# Patient Record
Sex: MALE | Race: WHITE | NOT HISPANIC OR LATINO | Employment: STUDENT
[De-identification: names, ages, dates, MRNs, and addresses within clinical notes are randomized per-mention and may not be internally consistent; named-entity substitution may affect disease eponyms.]

---
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
## 2025-04-21 ENCOUNTER — OFFICE VISIT (OUTPATIENT)
Dept: URGENT CARE | Facility: URGENT CARE | Age: 20
End: 2025-04-21
Payer: COMMERCIAL

## 2025-04-21 VITALS
WEIGHT: 184 LBS | BODY MASS INDEX: 23.62 KG/M2 | HEART RATE: 60 BPM | DIASTOLIC BLOOD PRESSURE: 73 MMHG | TEMPERATURE: 97.4 F | OXYGEN SATURATION: 99 % | RESPIRATION RATE: 16 BRPM | SYSTOLIC BLOOD PRESSURE: 117 MMHG

## 2025-04-21 DIAGNOSIS — H00.11 CHALAZION OF RIGHT UPPER EYELID: Primary | ICD-10-CM

## 2025-04-21 PROCEDURE — 3078F DIAST BP <80 MM HG: CPT | Performed by: NURSE PRACTITIONER

## 2025-04-21 PROCEDURE — 3074F SYST BP LT 130 MM HG: CPT | Performed by: NURSE PRACTITIONER

## 2025-04-21 PROCEDURE — 99213 OFFICE O/P EST LOW 20 MIN: CPT | Performed by: NURSE PRACTITIONER

## 2025-04-21 RX ORDER — ERYTHROMYCIN 5 MG/G
0.5 OINTMENT OPHTHALMIC 3 TIMES DAILY
Qty: 3.5 G | Refills: 0 | Status: SHIPPED | OUTPATIENT
Start: 2025-04-21 | End: 2025-04-28

## 2025-04-21 NOTE — PATIENT INSTRUCTIONS
Prescribed erythromycin eye drops to utilize four times a day for 7 days.     Use warm compress with a clean wet washcloth 4 times a day.     Take daily antihistamine such as Claritin, Allegra or Zyrtec to help with itching, swelling or discomfort.    Do not wear contacts while treating eye. Wear glasses instead.    Follow up at eye clinic in one to two weeks.    If you develop any red flag symptoms (eye pain, fevers, etc) please go directly to the Emergency Department.

## 2025-04-21 NOTE — PROGRESS NOTES
Urgent Care Clinic Visit    Chief Complaint   Patient presents with    Eye Problem     1 month, bump on eyeball, hasn't been to eye dr yet               8/22/2024   Vision Screen   Reason Vision Screen Not Completed Patient had exam in last 12 months         4/21/2025    10:05 AM   Additional Questions   Roomed by nay holcomb   Accompanied by bren

## 2025-04-21 NOTE — PROGRESS NOTES
Assessment & Plan       ICD-10-CM    1. Chalazion of right upper eyelid  H00.11 erythromycin (ROMYCIN) 5 MG/GM ophthalmic ointment           Patient Instructions   Prescribed erythromycin eye drops to utilize four times a day for 7 days.     Use warm compress with a clean wet washcloth 4 times a day.     Take daily antihistamine such as Claritin, Allegra or Zyrtec to help with itching, swelling or discomfort.    Do not wear contacts while treating eye. Wear glasses instead.    Follow up at eye clinic in one to two weeks.    If you develop any red flag symptoms (eye pain, fevers, etc) please go directly to the Emergency Department.       Patient and his father agreed to the treatment plan and verbalized understanding.         Grayson Kc is a 19 year old male who presents to clinic today with his father for the following health issues:  Chief Complaint   Patient presents with    Eye Problem     1 month, bump on right eyeball, hasn't been to eye dr yet     HPI  Patient has had a raised lesion on his right upper eyelid for approximately a month.  He states is not been red and it is not really able to be visualized or felt without palpating the skin.  He denies any eye pain, vision changes, eye drainage, eye irritation, eye trauma, or any other eye issue.  He states he does wear contacts.  He denies any systemic symptoms such as fevers, cough, congestion, or ear pain.  He has not taken any medications or done any treatments to the site.      Review of Systems   All other systems reviewed and are negative.      Problem List:  2022-01: Right groin pain  2019-01: Shoulder pain, right  2019-01: Scapular dyskinesis      No past medical history on file.      Social History     Tobacco Use    Smoking status: Never    Smokeless tobacco: Never   Substance Use Topics    Alcohol use: No           Objective    /73 (BP Location: Right arm, Patient Position: Sitting, Cuff Size: Adult Regular)   Pulse 60   Temp 97.4   F (36.3  C) (Tympanic)   Resp 16   Wt 83.5 kg (184 lb)   SpO2 99%   BMI 23.62 kg/m    Physical Exam  Vitals and nursing note reviewed.   Constitutional:       Appearance: Normal appearance.   HENT:      Head: Normocephalic and atraumatic.      Right Ear: Tympanic membrane and ear canal normal.      Left Ear: Tympanic membrane and ear canal normal.      Nose: Nose normal.      Mouth/Throat:      Mouth: Mucous membranes are moist.      Pharynx: Oropharynx is clear. No oropharyngeal exudate or posterior oropharyngeal erythema.   Eyes:      General:         Right eye: No discharge.         Left eye: No discharge.      Extraocular Movements: Extraocular movements intact.      Conjunctiva/sclera: Conjunctivae normal.      Pupils: Pupils are equal, round, and reactive to light.      Comments: With palpation a small 2 mm bump can be felt on the upper eyelid approximately 1 cm from the eyelashes.  No erythema, edema, or other abnormality noted.   Cardiovascular:      Rate and Rhythm: Normal rate and regular rhythm.      Heart sounds: Normal heart sounds.   Pulmonary:      Effort: Pulmonary effort is normal.      Breath sounds: Normal breath sounds.   Musculoskeletal:      Cervical back: Normal range of motion and neck supple.   Lymphadenopathy:      Cervical: No cervical adenopathy.   Neurological:      Mental Status: He is alert.              Myrtle Paulson NP

## 2025-07-23 ENCOUNTER — PATIENT OUTREACH (OUTPATIENT)
Dept: CARE COORDINATION | Facility: CLINIC | Age: 20
End: 2025-07-23
Payer: COMMERCIAL

## 2025-08-06 ENCOUNTER — PATIENT OUTREACH (OUTPATIENT)
Dept: CARE COORDINATION | Facility: CLINIC | Age: 20
End: 2025-08-06

## 2025-08-06 ENCOUNTER — OFFICE VISIT (OUTPATIENT)
Dept: OPTOMETRY | Facility: CLINIC | Age: 20
End: 2025-08-06
Payer: COMMERCIAL

## 2025-08-06 DIAGNOSIS — H52.13 HIGH MYOPIA, BILATERAL: Primary | ICD-10-CM

## 2025-08-06 DIAGNOSIS — H52.203 ASTIGMATISM OF BOTH EYES, UNSPECIFIED TYPE: ICD-10-CM

## 2025-08-06 DIAGNOSIS — H10.89 GIANT PAPILLARY CONJUNCTIVITIS: ICD-10-CM

## 2025-08-06 PROCEDURE — 92015 DETERMINE REFRACTIVE STATE: CPT | Performed by: OPTOMETRIST

## 2025-08-06 PROCEDURE — 92014 COMPRE OPH EXAM EST PT 1/>: CPT | Performed by: OPTOMETRIST

## 2025-08-06 PROCEDURE — 92310 CONTACT LENS FITTING OU: CPT | Mod: GA | Performed by: OPTOMETRIST

## 2025-08-06 ASSESSMENT — REFRACTION_MANIFEST
OD_CYLINDER: +2.75
OS_SPHERE: -6.75
OS_CYLINDER: +2.75
OS_AXIS: 065
OD_SPHERE: -8.25
OD_AXIS: 094

## 2025-08-06 ASSESSMENT — CONF VISUAL FIELD
OD_INFERIOR_TEMPORAL_RESTRICTION: 0
OS_SUPERIOR_NASAL_RESTRICTION: 0
METHOD: COUNTING FINGERS
OD_SUPERIOR_NASAL_RESTRICTION: 0
OS_NORMAL: 1
OS_INFERIOR_NASAL_RESTRICTION: 0
OD_INFERIOR_NASAL_RESTRICTION: 0
OS_INFERIOR_TEMPORAL_RESTRICTION: 0
OD_NORMAL: 1
OS_SUPERIOR_TEMPORAL_RESTRICTION: 0
OD_SUPERIOR_TEMPORAL_RESTRICTION: 0

## 2025-08-06 ASSESSMENT — REFRACTION_CURRENTRX
OS_AXIS: 160
OD_CYLINDER: -2.25
OD_AXIS: 180
OS_SPHERE: -4.00
OS_CYLINDER: -2.25
OS_BRAND: ALCON AIR OPTIX FOR ASTIGMATISM BC 8.7, D 14.5
OD_SPHERE: -5.50
OD_BRAND: ALCON AIR OPTIX FOR ASTIGMATISM BC 8.7, D 14.5

## 2025-08-06 ASSESSMENT — VISUAL ACUITY
METHOD: SNELLEN - LINEAR
OS_CC: 20/20
OS_CC: 20/20
CORRECTION_TYPE: CONTACTS
OD_CC: 20/30
OD_CC: 20/25

## 2025-08-06 ASSESSMENT — TONOMETRY
OS_IOP_MMHG: 15
IOP_METHOD: APPLANATION
OD_IOP_MMHG: 13

## 2025-08-06 ASSESSMENT — CUP TO DISC RATIO
OS_RATIO: 0.75
OD_RATIO: 0.7

## 2025-08-06 ASSESSMENT — SLIT LAMP EXAM - LIDS
COMMENTS: NORMAL
COMMENTS: NORMAL

## 2025-08-06 ASSESSMENT — EXTERNAL EXAM - LEFT EYE: OS_EXAM: NORMAL

## 2025-08-06 ASSESSMENT — EXTERNAL EXAM - RIGHT EYE: OD_EXAM: NORMAL
